# Patient Record
(demographics unavailable — no encounter records)

---

## 2025-01-17 NOTE — DISCUSSION/SUMMARY
[Patient] : the patient [With Me] : with me [FreeTextEntry3] : Post-testing [FreeTextEntry1] : 56F w/ PMH as above presenting for evaluation of palpitations in the setting of low TSH levels from high synthyroid dose.    1. Palps/TINOCO - TTE, 1 week Zio and ETT for HR response to exercise response and ischemic r/o.  RTC post-testing

## 2025-01-17 NOTE — HISTORY OF PRESENT ILLNESS
[FreeTextEntry1] : 56F w/ PMH of hypothyroidism, former smoker, fibromyalgia presenting for evaluation of elevated resting heart rates and palpitations.  Recent thyroid functions have shown significantly low TSH levels.  Synthroid dosing was recently dropped from 125 mcg to 100 mcg daily. She reports some TINOCO,

## 2025-01-17 NOTE — REASON FOR VISIT
[Symptom and Test Evaluation] : symptom and test evaluation [Arrhythmia/ECG Abnorrmalities] : arrhythmia/ECG abnormalities [FreeTextEntry3] : Dr. Lombardi

## 2025-02-28 NOTE — DISCUSSION/SUMMARY
[Patient] : the patient [With Me] : with me [FreeTextEntry3] : Post-testing [FreeTextEntry1] : 56F w/ PMH as above presenting for evaluation of palpitations in the setting of low TSH levels from high synthyroid dose.    1. Palps/TINOCO - improved with lowering of synthroid dosing.     RTC 1 year

## 2025-02-28 NOTE — HISTORY OF PRESENT ILLNESS
[FreeTextEntry1] : 56F w/ PMH of hypothyroidism, former smoker, fibromyalgia presenting for evaluation of elevated resting heart rates and palpitations.  Recent thyroid functions have shown significantly low TSH levels.  Synthroid dosing was recently dropped from 125 mcg to 100 mcg daily. She reports some TINOCO,  2/28/2025: Symptoms have subsided.  Echo showed preserved EF without significant valve issues.  Zio patch revealed no significant arrhythmia other than some PACs.  Stress was negative for ischemia with 13 minutes of exercise.

## 2025-06-06 NOTE — HISTORY OF PRESENT ILLNESS
[postmenopausal] : postmenopausal [N] : Patient denies prior pregnancies [Currently Active] : currently active [Women] : women [FreeTextEntry1] : 56 yr old P0 here for annual exam. Patient has not been seen in a long time. Recent derm procedure, excision of mole. Sister duodenal cancer treatment at [facility] [place]. Family was told that they do not need genetic testing. Patient having hot flashes. LMP over 1 year ago. Discussed options for hot flashes/menopause including HRT, Bonafide products, veozah. Will think about it [Mammogramdate] : 05/25 [TextBox_19] : Z&P [BreastSonogramDate] : 05/25 [TextBox_25] : Z&P [BoneDensityDate] : NEVER [LMPDate] :

## 2025-06-06 NOTE — PLAN
[FreeTextEntry1] : Pap Bone density Options discussed for hot flashes. Risks/benefits discussed Patient will think about it.